# Patient Record
Sex: FEMALE | Employment: OTHER | URBAN - METROPOLITAN AREA
[De-identification: names, ages, dates, MRNs, and addresses within clinical notes are randomized per-mention and may not be internally consistent; named-entity substitution may affect disease eponyms.]

---

## 2024-01-01 ENCOUNTER — HOSPITAL ENCOUNTER (EMERGENCY)
Age: 69
End: 2024-11-08
Attending: EMERGENCY MEDICINE
Payer: MEDICARE

## 2024-01-01 ENCOUNTER — APPOINTMENT (OUTPATIENT)
Dept: GENERAL RADIOLOGY | Age: 69
End: 2024-01-01
Payer: MEDICARE

## 2024-01-01 ENCOUNTER — APPOINTMENT (OUTPATIENT)
Dept: CT IMAGING | Age: 69
End: 2024-01-01
Payer: MEDICARE

## 2024-01-01 VITALS
DIASTOLIC BLOOD PRESSURE: 73 MMHG | HEART RATE: 193 BPM | WEIGHT: 293 LBS | BODY MASS INDEX: 53.92 KG/M2 | RESPIRATION RATE: 111 BRPM | OXYGEN SATURATION: 73 % | SYSTOLIC BLOOD PRESSURE: 129 MMHG | HEIGHT: 62 IN

## 2024-01-01 DIAGNOSIS — J96.21 ACUTE ON CHRONIC HYPOXIC RESPIRATORY FAILURE: ICD-10-CM

## 2024-01-01 DIAGNOSIS — I46.9 CARDIORESPIRATORY ARREST: Primary | ICD-10-CM

## 2024-01-01 DIAGNOSIS — J18.9 MULTIFOCAL PNEUMONIA: ICD-10-CM

## 2024-01-01 LAB
ABO + RH BLD: NORMAL
ALBUMIN SERPL-MCNC: 1.2 G/DL (ref 3.4–5)
ALBUMIN/GLOB SERPL: 0.5 {RATIO} (ref 1.1–2.2)
ALP SERPL-CCNC: 169 U/L (ref 40–129)
ALT SERPL-CCNC: 34 U/L (ref 10–40)
ANION GAP SERPL CALCULATED.3IONS-SCNC: 21 MMOL/L (ref 3–16)
ANISOCYTOSIS BLD QL SMEAR: ABNORMAL
APTT BLD: 45.2 SEC (ref 22.1–36.4)
AST SERPL-CCNC: 70 U/L (ref 15–37)
BASE EXCESS BLDV CALC-SCNC: -9.9 MMOL/L (ref -3–3)
BASOPHILS # BLD: 0 K/UL (ref 0–0.2)
BASOPHILS NFR BLD: 0 %
BILIRUB SERPL-MCNC: 0.4 MG/DL (ref 0–1)
BLD GP AB SCN SERPL QL: NORMAL
BLD GP AB SCN SERPL QL: NORMAL
BUN SERPL-MCNC: 26 MG/DL (ref 7–20)
CALCIUM SERPL-MCNC: 9.7 MG/DL (ref 8.3–10.6)
CHLORIDE SERPL-SCNC: 94 MMOL/L (ref 99–110)
CO2 BLDV-SCNC: 49 MMOL/L
CO2 SERPL-SCNC: 20 MMOL/L (ref 21–32)
COHGB MFR BLDV: 3.5 % (ref 0–1.5)
CREAT SERPL-MCNC: 2.2 MG/DL (ref 0.6–1.2)
DEPRECATED RDW RBC AUTO: 14.9 % (ref 12.4–15.4)
DO-HGB MFR BLDV: 9 %
EKG ATRIAL RATE: 64 BPM
EKG DIAGNOSIS: NORMAL
EKG P AXIS: 57 DEGREES
EKG P-R INTERVAL: 210 MS
EKG Q-T INTERVAL: 376 MS
EKG QRS DURATION: 140 MS
EKG QTC CALCULATION (BAZETT): 387 MS
EKG R AXIS: 76 DEGREES
EKG T AXIS: 173 DEGREES
EKG VENTRICULAR RATE: 64 BPM
EOSINOPHIL # BLD: 0 K/UL (ref 0–0.6)
EOSINOPHIL NFR BLD: 0 %
GFR SERPLBLD CREATININE-BSD FMLA CKD-EPI: 23 ML/MIN/{1.73_M2}
GLUCOSE BLD-MCNC: 116 MG/DL (ref 70–99)
GLUCOSE BLD-MCNC: 69 MG/DL (ref 70–99)
GLUCOSE SERPL-MCNC: 199 MG/DL (ref 70–99)
HCO3 BLDV-SCNC: 19.9 MMOL/L (ref 23–29)
HCT VFR BLD AUTO: 30 % (ref 36–48)
HGB BLD-MCNC: 8.4 G/DL (ref 12–16)
INR PPP: 3.03 (ref 0.85–1.15)
LACTATE BLDV-SCNC: 15.8 MMOL/L (ref 0.4–1.9)
LIPASE SERPL-CCNC: 5 U/L (ref 13–60)
LYMPHOCYTES # BLD: 3 K/UL (ref 1–5.1)
LYMPHOCYTES NFR BLD: 22 %
MCH RBC QN AUTO: 27.7 PG (ref 26–34)
MCHC RBC AUTO-ENTMCNC: 28.2 G/DL (ref 31–36)
MCV RBC AUTO: 98.5 FL (ref 80–100)
METHGB MFR BLDV: 1.6 %
MONOCYTES # BLD: 0.8 K/UL (ref 0–1.3)
MONOCYTES NFR BLD: 6 %
MYELOCYTES NFR BLD MANUAL: 2 %
NEUTROPHILS # BLD: 9.7 K/UL (ref 1.7–7.7)
NEUTROPHILS NFR BLD: 64 %
NEUTS BAND NFR BLD MANUAL: 6 % (ref 0–7)
NRBC BLD-RTO: 2 /100 WBC
O2 CT VFR BLDV CALC: 11 VOL %
O2 THERAPY: ABNORMAL
PCO2 BLDV: 67.4 MMHG (ref 40–50)
PERFORMED ON: ABNORMAL
PERFORMED ON: ABNORMAL
PH BLDV: 7.08 [PH] (ref 7.35–7.45)
PLATELET # BLD AUTO: 338 K/UL (ref 135–450)
PLATELET BLD QL SMEAR: ADEQUATE
PMV BLD AUTO: 8.4 FL (ref 5–10.5)
PO2 BLDV: 88 MMHG (ref 25–40)
POLYCHROMASIA BLD QL SMEAR: ABNORMAL
POTASSIUM SERPL-SCNC: 4.5 MMOL/L (ref 3.5–5.1)
PROT SERPL-MCNC: 3.5 G/DL (ref 6.4–8.2)
PROTHROMBIN TIME: 31.2 SEC (ref 11.9–14.9)
RBC # BLD AUTO: 3.05 M/UL (ref 4–5.2)
SAO2 % BLDV: 91 %
SLIDE REVIEW: ABNORMAL
SODIUM SERPL-SCNC: 135 MMOL/L (ref 136–145)
TOXIC GRANULES BLD QL SMEAR: PRESENT
TROPONIN, HIGH SENSITIVITY: 96 NG/L (ref 0–14)
WBC # BLD AUTO: 13.5 K/UL (ref 4–11)

## 2024-01-01 PROCEDURE — 2580000003 HC RX 258: Performed by: EMERGENCY MEDICINE

## 2024-01-01 PROCEDURE — 99291 CRITICAL CARE FIRST HOUR: CPT

## 2024-01-01 PROCEDURE — 87077 CULTURE AEROBIC IDENTIFY: CPT

## 2024-01-01 PROCEDURE — 85730 THROMBOPLASTIN TIME PARTIAL: CPT

## 2024-01-01 PROCEDURE — 99285 EMERGENCY DEPT VISIT HI MDM: CPT

## 2024-01-01 PROCEDURE — 87150 DNA/RNA AMPLIFIED PROBE: CPT

## 2024-01-01 PROCEDURE — 71045 X-RAY EXAM CHEST 1 VIEW: CPT

## 2024-01-01 PROCEDURE — 85025 COMPLETE CBC W/AUTO DIFF WBC: CPT

## 2024-01-01 PROCEDURE — 93010 ELECTROCARDIOGRAM REPORT: CPT | Performed by: INTERNAL MEDICINE

## 2024-01-01 PROCEDURE — 36556 INSERT NON-TUNNEL CV CATH: CPT

## 2024-01-01 PROCEDURE — 96375 TX/PRO/DX INJ NEW DRUG ADDON: CPT

## 2024-01-01 PROCEDURE — 86850 RBC ANTIBODY SCREEN: CPT

## 2024-01-01 PROCEDURE — 86900 BLOOD TYPING SEROLOGIC ABO: CPT

## 2024-01-01 PROCEDURE — 82803 BLOOD GASES ANY COMBINATION: CPT

## 2024-01-01 PROCEDURE — 2500000003 HC RX 250 WO HCPCS: Performed by: EMERGENCY MEDICINE

## 2024-01-01 PROCEDURE — 83605 ASSAY OF LACTIC ACID: CPT

## 2024-01-01 PROCEDURE — 92950 HEART/LUNG RESUSCITATION CPR: CPT

## 2024-01-01 PROCEDURE — 87040 BLOOD CULTURE FOR BACTERIA: CPT

## 2024-01-01 PROCEDURE — 6360000002 HC RX W HCPCS: Performed by: EMERGENCY MEDICINE

## 2024-01-01 PROCEDURE — 80053 COMPREHEN METABOLIC PANEL: CPT

## 2024-01-01 PROCEDURE — 94002 VENT MGMT INPAT INIT DAY: CPT

## 2024-01-01 PROCEDURE — 96374 THER/PROPH/DIAG INJ IV PUSH: CPT

## 2024-01-01 PROCEDURE — 96365 THER/PROPH/DIAG IV INF INIT: CPT

## 2024-01-01 PROCEDURE — 84484 ASSAY OF TROPONIN QUANT: CPT

## 2024-01-01 PROCEDURE — 85610 PROTHROMBIN TIME: CPT

## 2024-01-01 PROCEDURE — 83690 ASSAY OF LIPASE: CPT

## 2024-01-01 PROCEDURE — 86901 BLOOD TYPING SEROLOGIC RH(D): CPT

## 2024-01-01 PROCEDURE — 93005 ELECTROCARDIOGRAM TRACING: CPT | Performed by: EMERGENCY MEDICINE

## 2024-01-01 PROCEDURE — 87186 SC STD MICRODIL/AGAR DIL: CPT

## 2024-01-01 PROCEDURE — 36415 COLL VENOUS BLD VENIPUNCTURE: CPT

## 2024-01-01 PROCEDURE — 96368 THER/DIAG CONCURRENT INF: CPT

## 2024-01-01 RX ORDER — SACCHAROMYCES BOULARDII 250 MG
250 CAPSULE ORAL DAILY
COMMUNITY

## 2024-01-01 RX ORDER — UREA 10 %
500 LOTION (ML) TOPICAL DAILY
COMMUNITY

## 2024-01-01 RX ORDER — NOREPINEPHRINE BITARTRATE 0.06 MG/ML
1-100 INJECTION, SOLUTION INTRAVENOUS CONTINUOUS
Status: DISCONTINUED | OUTPATIENT
Start: 2024-01-01 | End: 2024-01-01 | Stop reason: HOSPADM

## 2024-01-01 RX ORDER — BISACODYL 10 MG
10 SUPPOSITORY, RECTAL RECTAL DAILY PRN
COMMUNITY

## 2024-01-01 RX ORDER — DILTIAZEM HYDROCHLORIDE 180 MG/1
180 CAPSULE, COATED, EXTENDED RELEASE ORAL DAILY
COMMUNITY

## 2024-01-01 RX ORDER — MIDODRINE HYDROCHLORIDE 10 MG/1
10 TABLET ORAL DAILY
COMMUNITY

## 2024-01-01 RX ORDER — HYDROCORTISONE SODIUM SUCCINATE 100 MG/2ML
100 INJECTION INTRAMUSCULAR; INTRAVENOUS ONCE
Status: COMPLETED | OUTPATIENT
Start: 2024-01-01 | End: 2024-01-01

## 2024-01-01 RX ORDER — POLYETHYLENE GLYCOL 3350 17 G/17G
17 POWDER, FOR SOLUTION ORAL DAILY PRN
COMMUNITY

## 2024-01-01 RX ORDER — ONDANSETRON 4 MG/1
4 TABLET, FILM COATED ORAL EVERY 6 HOURS PRN
COMMUNITY

## 2024-01-01 RX ORDER — CHOLECALCIFEROL (VITAMIN D3) 1250 MCG
50000 CAPSULE ORAL WEEKLY
COMMUNITY

## 2024-01-01 RX ORDER — ACETAMINOPHEN 325 MG/1
650 TABLET ORAL EVERY 6 HOURS PRN
COMMUNITY

## 2024-01-01 RX ORDER — GABAPENTIN 600 MG/1
600 TABLET ORAL 3 TIMES DAILY
COMMUNITY

## 2024-01-01 RX ORDER — MIDODRINE HYDROCHLORIDE 10 MG/1
10 TABLET ORAL EVERY 12 HOURS PRN
COMMUNITY

## 2024-01-01 RX ORDER — LACTULOSE 10 G/15ML
20 SOLUTION ORAL DAILY
COMMUNITY

## 2024-01-01 RX ORDER — ACETYLCYSTEINE 100 MG/ML
4 SOLUTION ORAL; RESPIRATORY (INHALATION) EVERY 4 HOURS PRN
COMMUNITY

## 2024-01-01 RX ORDER — FERROUS SULFATE 325(65) MG
325 TABLET ORAL
COMMUNITY

## 2024-01-01 RX ORDER — INSULIN GLARGINE 300 U/ML
4 INJECTION, SOLUTION SUBCUTANEOUS NIGHTLY
COMMUNITY

## 2024-01-01 RX ORDER — AMOXICILLIN 250 MG
1 CAPSULE ORAL 2 TIMES DAILY
COMMUNITY

## 2024-01-01 RX ADMIN — Medication 10 MCG/MIN: at 11:20

## 2024-01-01 RX ADMIN — HYDROCORTISONE SODIUM SUCCINATE 100 MG: 100 INJECTION, POWDER, FOR SOLUTION INTRAMUSCULAR; INTRAVENOUS at 12:20

## 2024-01-01 RX ADMIN — VASOPRESSIN 0.03 UNITS/MIN: 20 INJECTION INTRAVENOUS at 12:10

## 2024-01-01 ASSESSMENT — PULMONARY FUNCTION TESTS: PIF_VALUE: 28

## 2024-11-08 NOTE — ED NOTES
Family declined renae at this time, family states waiting for more family to come and then will come to see patient.

## 2024-11-08 NOTE — ED NOTES
Code Narrative     1102- No pulse, CPR initiated.   1105- Pulse check- PEA, CPR resumed  1106- EPI given  1107- Pulse check- PEA, CPR resumed  1109- Pulse check- PEA, CPR resumed  1109- EPI given  1111- Pulse Check, PEA, CPR resumed, D50 given for BG 69.  1113- Pulse check, vfib, shocked, CPR resumed  1115- Pulse check, PEA  1116- calcium gluconate and bicarb given.  1117- Pulse check- Sinus Rhythm   1120- Levophed started.

## 2024-11-08 NOTE — ED NOTES
Pt started to miller down in the 20s-30s. Pulse checked, no pulse. CPR initiated, respiratory and MD called at bedside

## 2024-11-08 NOTE — ED NOTES
Code Narrative    1222- Pulse check- PEA, CPR initiated  1223- EPI given  1226- Pulse check- PEA, CPR resumed  1228- Bicarb given, pulse check, PEA  1230- Pulse check- PEA  1232- EPI given, CPR resumed  1234- Pulse check, no cardiac movement, CPR resumed  1236- Pulse check- PEA. EPI given  1238- Pulse check, PEA. CPR resumed. Atropine given  1240- Pulse check- Asystole. CPR resumed  1242- Pulse check, Asystole. CPR resumed, EPI given  1244- Pulse check- No spontaneous cardiac movement per MD. CPR resumed  1246- Pulse check- Asystole. Time of death called at this time.

## 2024-11-08 NOTE — PROGRESS NOTES
Pharmacy Home Medication Reconciliation Note    A medication reconciliation has been completed for Flakita Charles 1955    Information provided by: facility list ([facility])    The patient's home medication list is as follows:  No current facility-administered medications on file prior to encounter.     Current Outpatient Medications on File Prior to Encounter   Medication Sig Dispense Refill    acetylcysteine (MUCOMYST) 10 % nebulizer solution Inhale 4 mLs into the lungs every 4 hours as needed      bisacodyl (DULCOLAX) 10 MG suppository Place 1 suppository rectally daily as needed for Constipation      dilTIAZem (CARDIZEM CD) 180 MG extended release capsule Take 1 capsule by mouth daily      apixaban (ELIQUIS) 5 MG TABS tablet Take 1 tablet by mouth 2 times daily      ferrous sulfate (IRON 325) 325 (65 Fe) MG tablet Take 1 tablet by mouth daily (with breakfast)      gabapentin (NEURONTIN) 600 MG tablet Take 1 tablet by mouth 3 times daily.      lactulose (CHRONULAC) 10 GM/15ML solution Take 30 mLs by mouth daily      melatonin 3 MG TABS tablet Take 1 tablet by mouth at bedtime      midodrine (PROAMATINE) 10 MG tablet Take 1 tablet by mouth daily Hold if SBP >140      midodrine (PROAMATINE) 10 MG tablet Take 1 tablet by mouth every 12 hours as needed (SBP <90)      polyethylene glycol (GLYCOLAX) 17 g packet Take 1 packet by mouth daily as needed for Constipation      omeprazole (PRILOSEC) 20 MG delayed release capsule Take 1 capsule by mouth daily      saccharomyces boulardii (FLORASTOR) 250 MG capsule Take 1 capsule by mouth daily      Semaglutide, 1 MG/DOSE, (OZEMPIC) 4 MG/3ML SOPN sc injection Inject 1 mg into the skin every 7 days      senna-docusate (PERICOLACE) 8.6-50 MG per tablet Take 1 tablet by mouth in the morning and at bedtime      Torsemide 60 MG TABS Take 60 mg by mouth daily      Insulin Glargine, 2 Unit Dial, (TOUJEO MAX SOLOSTAR) 300 UNIT/ML concentrated injection pen Inject 4 Units into the

## 2024-11-08 NOTE — ED PROVIDER NOTES
[facility] EMERGENCY DEPARTMENT  EMERGENCY DEPARTMENT ENCOUNTER        Pt Name: Flakita Charles  MRN: [id number]  Birthdate 1955  Date of evaluation: 11/8/2024  Provider: Bakari Villegas MD  PCP: Jaylen Rodriguez MD  Note Started: 12:09 PM EST 11/8/24    CHIEF COMPLAINT       Chief Complaint   Patient presents with    Code     Came by  EMS from [facility] with reports of being unresponsive with trach at 80%, bagging in process. Upon arrival, no pulse, CPR in progress, MD at bedside.        HISTORY OF PRESENT ILLNESS: 1 or more Elements     History from : EMS    Limitations to history : Altered Mental Status    Flakita Charles is a 69 y.o. female who presents for being unresponsive, hypoxia.  At 730 this morning patient was found to be hypoxic and unresponsive is typically on a trach collar for COPD with chronic respiratory failure and was placed on a trach vent and 911 was called.  Upon arrival EMS states patient is GCS of 3.  They were unable to get a blood pressure, EKG or good oxygen waveform and route. PerEMS patient had a pulse upon their arrival however upon arriving to the ED patient is pulseless, asystole.  Still GCS of 3 pupils were bilateral fixed and dilated.  Patient is saturating 80% even on the trach.  Patient has no spontaneous movement.  History of diabetes.  Patient is full code.  Patient provides no history    Nursing Notes were all reviewed and agreed with or any disagreements were addressed in the HPI.    REVIEW OF SYSTEMS :      Review of Systems   Unable to perform ROS: Patient unresponsive       Positives and Pertinent negatives as per HPI.     SURGICAL HISTORY   History reviewed. No pertinent surgical history.    CURRENTMEDICATIONS       Previous Medications    ACETAMINOPHEN (TYLENOL) 325 MG TABLET    Take 2 tablets by mouth every 6 hours as needed for Pain    ACETYLCYSTEINE (MUCOMYST) 10 % NEBULIZER SOLUTION    Inhale 4 mLs into the lungs every 4 hours as  clinical response to the IV medications, reviewing the nursing notes, consultation time, dictation/documentation time, discussions with the patients/family, and interpretation of the labwork. (This time excludes time spent performing procedures). See ED course for details of reevaluation and medical decision making.     PAST MEDICAL HISTORY      has no past medical history on file.     EMERGENCY DEPARTMENT COURSE and DIFFERENTIAL DIAGNOSIS/MDM:   Vitals:    Vitals:    24 1235 24 1240 24 1245 24 1300   BP:       Pulse: 54 (!) 109 (!) 193    Resp:       SpO2: (!) 74% (!) 73% (!) 73%    Weight:    (!) 158.8 kg (350 lb)   Height:    1.575 m (5' 2\")         Is this patient to be included in the SEP-1 Core Measure due to severe sepsis or septic shock?   No   Exclusion criteria - the patient is NOT to be included for SEP-1 Core Measure due to:  Patient  prior to being able to start step 1 measures      Chronic Conditions: Diabetes, obesity, COPD, chronic respiratory failure    CONSULTS: (Who and What was discussed)  IP CONSULT TO CARDIOLOGY    Discussion with Other Profesionals : Consultant cardiology JARED Adkins at bedside    Social Determinants : Nursing facility patient    Records Reviewed : Inpatient Notes reviewed inpatient note from 2024 and patient was admitted for respiratory failure with hypoxia    CC/HPI Summary, DDx, ED Course, and Reassessment:     Differential Diagnosis:    Hypoxemia/ischemic encephalopathy, hepatic encephalopathy   Seizure or postictal state   Alterations of glucose such as hypoglycemia and hyperglycemia    Alterations in perfusions such as hypotension and hypoperfusion    Alterations in electrolytes such as disturbances in sodium or calcium   Infectious processes such as sepsis from a pneumonia or urinary tract infection    Substance use or withdrawal, especially alcohol and drugs    Medication adverse event or interaction    Vitamin deficiencies such  (has no administration in time range)   hydrocortisone sodium succinate PF (SOLU-CORTEF) injection 100 mg (has no administration in time range)       Disposition Considerations (tests considered but not done, Shared Decision Making, Pt Expectation of Test or Tx.): Considered further workup including CT scans of the head chest and belly however patient remained unstable and was declared .      I am the Primary Clinician of Record.    I PERSONALLY SAW THE PATIENT AND PERFORMED A SUBSTANTIVE PORTION OF THE VISIT INCLUDING ALL ASPECTS OF THE MEDICAL DECISION MAKING PROCESS.    FINAL IMPRESSION      1. Cardiorespiratory arrest    2. Multifocal pneumonia    3. Acute on chronic hypoxic respiratory failure          DISPOSITION/PLAN     DISPOSITION  2024 01:38:45 PM           PATIENT REFERRED TO:  No follow-up provider specified.    DISCHARGE MEDICATIONS:  New Prescriptions    No medications on file       DISCONTINUED MEDICATIONS:  Discontinued Medications    No medications on file              (Please note that portions of this note were completed with a voice recognition program.  Efforts were made to edit the dictations but occasionally words are mis-transcribed.)    Bakari Villegas MD (electronically signed)            Bakari Villegas MD  [id number]       Bakari Villegas MD  24 [id number]       Bakari Villegas MD  [id number]

## 2024-11-09 LAB — REPORT: NORMAL

## 2024-11-10 LAB
BACTERIA BLD CULT ORG #2: ABNORMAL
BACTERIA BLD CULT ORG #2: ABNORMAL
BACTERIA BLD CULT: ABNORMAL
ORGANISM: ABNORMAL

## 2024-11-13 LAB
BACTERIA BLD CULT ORG #2: ABNORMAL
BACTERIA BLD CULT: ABNORMAL
BACTERIA BLD CULT: ABNORMAL
ORGANISM: ABNORMAL